# Patient Record
Sex: MALE | Race: WHITE
[De-identification: names, ages, dates, MRNs, and addresses within clinical notes are randomized per-mention and may not be internally consistent; named-entity substitution may affect disease eponyms.]

---

## 2018-02-08 ENCOUNTER — HOSPITAL ENCOUNTER (OUTPATIENT)
Dept: HOSPITAL 88 - US | Age: 58
End: 2018-02-08
Attending: INTERNAL MEDICINE
Payer: COMMERCIAL

## 2018-02-08 DIAGNOSIS — K76.0: Primary | ICD-10-CM

## 2018-02-08 PROCEDURE — 76705 ECHO EXAM OF ABDOMEN: CPT

## 2018-02-08 NOTE — DIAGNOSTIC IMAGING REPORT
PROCEDURE:LIVER ULTRASOUND

COMPARISON:Ultrasound abdomen 3/2/2017.

INDICATIONS:FATTY LIVER

 

FINDINGS:

 

Liver: 15.2 cm. Increased hepatic parenchymal echogenicity. No focal 

mass. 

Main portal vein: 1.0 cm. Hepatopedal flow. 

 

Gallbladder: No echogenic calculi, gallbladder thickening, or 

pericholecystic fluid.

Common Bile Duct: 4.0 mm. No echogenic filling defect. 

Sonographic Hudson's sign: Negative.

 

Right kidney: 11.5 cm. No solid or cystic mass, echogenic calculi, or 

hydronephrosis. Normal parenchymal echogenicity.

 

Pancreas: The pancreas was insufficiently visualized for comment 

secondary to overlying bowel gas.

 

Inferior vena cava: Normal.

Aorta: Normal.

Ascites: None.

 

CONCLUSION:

 

1. No acute sonographic abnormality.

2. Hepatic steatosis.

 

 

Dictated by:  Olvin Suazo M.D. on 2/08/2018 at 11:47     

Electronically approved by:  Olvin Suazo M.D. on 2/08/2018 at 11:47

## 2020-09-22 ENCOUNTER — HOSPITAL ENCOUNTER (OUTPATIENT)
Dept: HOSPITAL 88 - CT | Age: 60
End: 2020-09-22
Attending: INTERNAL MEDICINE
Payer: COMMERCIAL

## 2020-09-22 DIAGNOSIS — G89.29: Primary | ICD-10-CM

## 2020-09-22 DIAGNOSIS — R10.9: ICD-10-CM

## 2020-09-22 LAB
BUN SERPL-MCNC: 11 MG/DL (ref 7–26)
BUN/CREAT SERPL: 9 (ref 6–25)
EGFRCR SERPLBLD CKD-EPI 2021: 58 ML/MIN (ref 60–?)

## 2020-09-22 PROCEDURE — 96360 HYDRATION IV INFUSION INIT: CPT

## 2020-09-22 PROCEDURE — 82565 ASSAY OF CREATININE: CPT

## 2020-09-22 PROCEDURE — 36415 COLL VENOUS BLD VENIPUNCTURE: CPT

## 2020-09-22 PROCEDURE — 84520 ASSAY OF UREA NITROGEN: CPT

## 2020-09-22 PROCEDURE — 74177 CT ABD & PELVIS W/CONTRAST: CPT

## 2020-09-22 NOTE — DIAGNOSTIC IMAGING REPORT
EXAM: CT Abdomen and Pelvis WITH contrast  

INDICATION: Chronic generalized abdominal pain.

COMPARISON: None.

TECHNIQUE: Abdomen and pelvis were scanned utilizing a multidetector helical

scanner from the lung base to the pubic symphysis after administration of IV

contrast. Coronal and sagittal reformations were obtained. Routine protocol was

performed. Scan was performed when during portal venous phase.    

     IV CONTRAST: 100 mL of Isovue 370

     ORAL CONTRAST: None

            

COMPLICATIONS: None



RADIATION DOSE:

     Total DLP: 800.66 mGy*cm

     Estimated effective dose: (DLP x 0.015 x size factor) mSv

     CTDIvol has been reviewed. It is below the limits set by the Radiation

Protocol Committee (RPC).

     Dose modulation, iterative reconstruction, and/or weight based adjustment

of the mA/kV was utilized to reduce the radiation dose to as low as reasonably

achievable. 



FINDINGS:



LINES and TUBES: None.



LOWER THORAX:  Unremarkable



HEPATOBILIARY: The liver is diffuse hypodense compared to the spleen,

consistent with diffuse hepatic diffuse hepatic steatosis.  There are multiple

subcentimeter hypodense lesions in the hepatic dome, too small to characterize

by CT but most left represent benign cysts or hemangiomas. No biliary ductal

dilation. 



GALLBLADDER: No radio-opaque stones or sludge.  No wall thickening.



SPLEEN: No splenomegaly. 



PANCREAS: No focal masses or ductal dilatation.  



ADRENALS: No adrenal nodules    



KIDNEYS/URETERS: Kidneys enhance symmetrically.  No hydronephrosis. No cystic

or solid mass lesions.  No stones.



GI TRACT: There is diverticulosis coli without evidence of active inflammation.

No abnormal distention, wall thickening, or evidence of bowel obstruction.     

 Appendix is normal.



PELVIC ORGANS/BLADDER: Unremarkable.



LYMPH NODES: No lymphadenopathy.



VESSELS: Scattered mild arterial vascular calcifications.



PERITONEUM / RETROPERITONEUM: No free air or fluid.



BONES: There are degenerative changes in the spine.



SOFT TISSUES: Unremarkable.            



IMPRESSION: 



1.  No acute abdominopelvic abnormality identified.

2.  Hepatic steatosis.



Signed by: Yvette Brock MD on 9/22/2020 7:40 PM

## 2020-09-28 ENCOUNTER — HOSPITAL ENCOUNTER (OUTPATIENT)
Dept: HOSPITAL 88 - RAD | Age: 60
End: 2020-09-28
Attending: INTERNAL MEDICINE
Payer: COMMERCIAL

## 2020-09-28 DIAGNOSIS — R10.9: ICD-10-CM

## 2020-09-28 DIAGNOSIS — G89.29: ICD-10-CM

## 2020-09-28 DIAGNOSIS — Z53.8: ICD-10-CM

## 2020-09-28 DIAGNOSIS — Z01.818: Primary | ICD-10-CM

## 2020-09-28 DIAGNOSIS — U07.1: ICD-10-CM

## 2020-09-28 DIAGNOSIS — K29.70: ICD-10-CM

## 2020-09-28 PROCEDURE — 93005 ELECTROCARDIOGRAM TRACING: CPT

## 2020-09-29 ENCOUNTER — HOSPITAL ENCOUNTER (OUTPATIENT)
Dept: HOSPITAL 88 - OR | Age: 60
Discharge: HOME | End: 2020-09-29
Attending: INTERNAL MEDICINE
Payer: COMMERCIAL

## 2020-09-29 VITALS — SYSTOLIC BLOOD PRESSURE: 105 MMHG | DIASTOLIC BLOOD PRESSURE: 64 MMHG

## 2020-09-29 DIAGNOSIS — K63.5: ICD-10-CM

## 2020-09-29 DIAGNOSIS — K31.7: ICD-10-CM

## 2020-09-29 DIAGNOSIS — K44.9: ICD-10-CM

## 2020-09-29 DIAGNOSIS — K64.8: ICD-10-CM

## 2020-09-29 DIAGNOSIS — Z12.11: Primary | ICD-10-CM

## 2020-09-29 DIAGNOSIS — Z79.82: ICD-10-CM

## 2020-09-29 DIAGNOSIS — G89.29: ICD-10-CM

## 2020-09-29 DIAGNOSIS — K29.60: ICD-10-CM

## 2020-09-29 DIAGNOSIS — K20.9: ICD-10-CM

## 2020-09-29 DIAGNOSIS — K57.30: ICD-10-CM

## 2020-09-29 DIAGNOSIS — I10: ICD-10-CM

## 2020-09-29 DIAGNOSIS — K22.8: ICD-10-CM

## 2020-09-29 DIAGNOSIS — R00.1: ICD-10-CM

## 2020-09-29 PROCEDURE — 45384 COLONOSCOPY W/LESION REMOVAL: CPT

## 2020-09-29 PROCEDURE — 43239 EGD BIOPSY SINGLE/MULTIPLE: CPT

## 2021-04-12 ENCOUNTER — HOSPITAL ENCOUNTER (OUTPATIENT)
Dept: HOSPITAL 88 - RAD | Age: 61
End: 2021-04-12
Attending: FAMILY MEDICINE
Payer: COMMERCIAL

## 2021-04-12 DIAGNOSIS — R07.81: Primary | ICD-10-CM

## 2021-04-12 PROCEDURE — 71046 X-RAY EXAM CHEST 2 VIEWS: CPT

## 2022-05-09 LAB
ALBUMIN SERPL-MCNC: 3.6 G/DL (ref 3.5–5)
ALBUMIN/GLOB SERPL: 1 {RATIO} (ref 0.8–2)
ALP SERPL-CCNC: 47 IU/L (ref 40–150)
ALT SERPL-CCNC: 29 IU/L (ref 0–55)
ANION GAP SERPL CALC-SCNC: 12.3 MMOL/L (ref 8–16)
BASOPHILS # BLD AUTO: 0.1 10*3/UL (ref 0–0.1)
BASOPHILS NFR BLD AUTO: 1 % (ref 0–1)
BUN SERPL-MCNC: 16 MG/DL (ref 7–26)
BUN/CREAT SERPL: 15 (ref 6–25)
CALCIUM SERPL-MCNC: 8.8 MG/DL (ref 8.4–10.2)
CHLORIDE SERPL-SCNC: 108 MMOL/L (ref 98–107)
CHOLEST SERPL-MCNC: 176 MD/DL (ref 0–199)
CHOLEST/HDLC SERPL: 5.3 {RATIO} (ref 3.9–4.7)
CO2 SERPL-SCNC: 26 MMOL/L (ref 22–29)
DEPRECATED NEUTROPHILS # BLD AUTO: 3.1 10*3/UL (ref 2.1–6.9)
EGFRCR SERPLBLD CKD-EPI 2021: 69 ML/MIN (ref 60–?)
EOSINOPHIL # BLD AUTO: 0.1 10*3/UL (ref 0–0.4)
EOSINOPHIL NFR BLD AUTO: 2.5 % (ref 0–6)
ERYTHROCYTE [DISTWIDTH] IN CORD BLOOD: 11.9 % (ref 11.7–14.4)
GLOBULIN PLAS-MCNC: 3.6 G/DL (ref 2.3–3.5)
GLUCOSE SERPLBLD-MCNC: 124 MG/DL (ref 74–118)
HCT VFR BLD AUTO: 44.3 % (ref 38.2–49.6)
HDLC SERPL-MSCNC: 33 MG/DL (ref 40–60)
HGB BLD-MCNC: 15 G/DL (ref 14–18)
LDLC SERPL CALC-MCNC: 127 MG/DL (ref 60–130)
LYMPHOCYTES # BLD: 1.1 10*3/UL (ref 1–3.2)
LYMPHOCYTES NFR BLD AUTO: 23.4 % (ref 18–39.1)
MCH RBC QN AUTO: 30.9 PG (ref 28–32)
MCHC RBC AUTO-ENTMCNC: 33.9 G/DL (ref 31–35)
MCV RBC AUTO: 91.2 FL (ref 81–99)
MONOCYTES # BLD AUTO: 0.4 10*3/UL (ref 0.2–0.8)
MONOCYTES NFR BLD AUTO: 8.6 % (ref 4.4–11.3)
NEUTS SEG NFR BLD AUTO: 64.3 % (ref 38.7–80)
PLATELET # BLD AUTO: 245 X10E3/UL (ref 140–360)
POTASSIUM SERPL-SCNC: 4.3 MMOL/L (ref 3.5–5.1)
RBC # BLD AUTO: 4.86 X10E6/UL (ref 4.3–5.7)
SODIUM SERPL-SCNC: 142 MMOL/L (ref 136–145)
TRIGL SERPL-MCNC: 78 MG/DL (ref 0–149)

## 2022-05-11 ENCOUNTER — HOSPITAL ENCOUNTER (OUTPATIENT)
Dept: HOSPITAL 88 - CATH LAB | Age: 62
Discharge: HOME | End: 2022-05-11
Attending: INTERNAL MEDICINE
Payer: COMMERCIAL

## 2022-05-11 VITALS — HEIGHT: 74 IN | WEIGHT: 265 LBS | BODY MASS INDEX: 34.01 KG/M2

## 2022-05-11 VITALS — DIASTOLIC BLOOD PRESSURE: 102 MMHG | SYSTOLIC BLOOD PRESSURE: 153 MMHG

## 2022-05-11 VITALS — DIASTOLIC BLOOD PRESSURE: 87 MMHG | SYSTOLIC BLOOD PRESSURE: 138 MMHG

## 2022-05-11 VITALS — SYSTOLIC BLOOD PRESSURE: 126 MMHG | DIASTOLIC BLOOD PRESSURE: 87 MMHG

## 2022-05-11 VITALS — SYSTOLIC BLOOD PRESSURE: 127 MMHG | DIASTOLIC BLOOD PRESSURE: 94 MMHG

## 2022-05-11 VITALS — SYSTOLIC BLOOD PRESSURE: 153 MMHG | DIASTOLIC BLOOD PRESSURE: 90 MMHG

## 2022-05-11 VITALS — DIASTOLIC BLOOD PRESSURE: 94 MMHG | SYSTOLIC BLOOD PRESSURE: 151 MMHG

## 2022-05-11 VITALS — DIASTOLIC BLOOD PRESSURE: 84 MMHG | SYSTOLIC BLOOD PRESSURE: 126 MMHG

## 2022-05-11 VITALS — DIASTOLIC BLOOD PRESSURE: 96 MMHG | SYSTOLIC BLOOD PRESSURE: 159 MMHG

## 2022-05-11 VITALS — DIASTOLIC BLOOD PRESSURE: 80 MMHG | SYSTOLIC BLOOD PRESSURE: 148 MMHG

## 2022-05-11 VITALS — SYSTOLIC BLOOD PRESSURE: 123 MMHG | DIASTOLIC BLOOD PRESSURE: 84 MMHG

## 2022-05-11 DIAGNOSIS — I65.29: ICD-10-CM

## 2022-05-11 DIAGNOSIS — Z82.49: ICD-10-CM

## 2022-05-11 DIAGNOSIS — I25.10: Primary | ICD-10-CM

## 2022-05-11 DIAGNOSIS — Z20.822: ICD-10-CM

## 2022-05-11 DIAGNOSIS — I10: ICD-10-CM

## 2022-05-11 DIAGNOSIS — R94.39: ICD-10-CM

## 2022-05-11 DIAGNOSIS — Z79.82: ICD-10-CM

## 2022-05-11 DIAGNOSIS — Z01.812: ICD-10-CM

## 2022-05-11 DIAGNOSIS — Z79.899: ICD-10-CM

## 2022-05-11 PROCEDURE — 80053 COMPREHEN METABOLIC PANEL: CPT

## 2022-05-11 PROCEDURE — 85025 COMPLETE CBC W/AUTO DIFF WBC: CPT

## 2022-05-11 PROCEDURE — 36415 COLL VENOUS BLD VENIPUNCTURE: CPT

## 2022-05-11 PROCEDURE — 93458 L HRT ARTERY/VENTRICLE ANGIO: CPT

## 2022-05-11 PROCEDURE — 80061 LIPID PANEL: CPT

## 2022-05-11 PROCEDURE — 99152 MOD SED SAME PHYS/QHP 5/>YRS: CPT

## 2023-03-13 ENCOUNTER — HOSPITAL ENCOUNTER (OUTPATIENT)
Dept: HOSPITAL 88 - OR | Age: 63
Discharge: HOME | End: 2023-03-13
Attending: INTERNAL MEDICINE
Payer: COMMERCIAL

## 2023-03-13 VITALS — DIASTOLIC BLOOD PRESSURE: 84 MMHG | SYSTOLIC BLOOD PRESSURE: 108 MMHG

## 2023-03-13 DIAGNOSIS — Z79.82: ICD-10-CM

## 2023-03-13 DIAGNOSIS — K44.9: ICD-10-CM

## 2023-03-13 DIAGNOSIS — K29.70: Primary | ICD-10-CM

## 2023-03-13 DIAGNOSIS — K22.70: ICD-10-CM

## 2023-03-13 DIAGNOSIS — K64.8: ICD-10-CM

## 2023-03-13 DIAGNOSIS — Z87.11: ICD-10-CM

## 2023-03-13 DIAGNOSIS — K31.7: ICD-10-CM

## 2023-03-13 DIAGNOSIS — Z71.89: ICD-10-CM

## 2023-03-13 DIAGNOSIS — K57.30: ICD-10-CM

## 2023-03-13 DIAGNOSIS — Z79.899: ICD-10-CM

## 2023-03-13 DIAGNOSIS — Z01.810: ICD-10-CM

## 2023-03-13 DIAGNOSIS — Z71.3: ICD-10-CM

## 2023-03-13 DIAGNOSIS — I10: ICD-10-CM

## 2023-03-13 PROCEDURE — 43239 EGD BIOPSY SINGLE/MULTIPLE: CPT

## 2023-03-13 PROCEDURE — 45378 DIAGNOSTIC COLONOSCOPY: CPT

## 2023-03-13 PROCEDURE — 93005 ELECTROCARDIOGRAM TRACING: CPT

## 2024-08-02 ENCOUNTER — HOSPITAL ENCOUNTER (OUTPATIENT)
Dept: HOSPITAL 88 - CT | Age: 64
End: 2024-08-02
Attending: NURSE PRACTITIONER
Payer: COMMERCIAL

## 2024-08-02 DIAGNOSIS — K76.0: Primary | ICD-10-CM

## 2024-08-02 DIAGNOSIS — Z86.010: ICD-10-CM

## 2024-08-02 DIAGNOSIS — K29.60: ICD-10-CM

## 2024-08-02 LAB
BUN SERPL-MCNC: 25 MG/DL (ref 7–26)
BUN/CREAT SERPL: 19 (ref 6–25)
EGFRCR SERPLBLD CKD-EPI 2021: 61 ML/MIN (ref 60–?)

## 2024-08-02 PROCEDURE — 84520 ASSAY OF UREA NITROGEN: CPT

## 2024-08-02 PROCEDURE — 82565 ASSAY OF CREATININE: CPT

## 2024-08-02 PROCEDURE — 74177 CT ABD & PELVIS W/CONTRAST: CPT

## 2024-08-02 PROCEDURE — 36415 COLL VENOUS BLD VENIPUNCTURE: CPT
